# Patient Record
Sex: FEMALE | Race: WHITE | NOT HISPANIC OR LATINO | Employment: FULL TIME | URBAN - NONMETROPOLITAN AREA
[De-identification: names, ages, dates, MRNs, and addresses within clinical notes are randomized per-mention and may not be internally consistent; named-entity substitution may affect disease eponyms.]

---

## 2017-08-15 ENCOUNTER — OFFICE VISIT - GICH (OUTPATIENT)
Dept: FAMILY MEDICINE | Facility: OTHER | Age: 32
End: 2017-08-15

## 2017-08-15 ENCOUNTER — HISTORY (OUTPATIENT)
Dept: FAMILY MEDICINE | Facility: OTHER | Age: 32
End: 2017-08-15

## 2017-08-15 DIAGNOSIS — R39.89 OTHER SYMPTOMS AND SIGNS INVOLVING THE GENITOURINARY SYSTEM: ICD-10-CM

## 2017-08-15 DIAGNOSIS — N89.8 OTHER SPECIFIED NONINFLAMMATORY DISORDERS OF VAGINA (CODE): ICD-10-CM

## 2017-08-15 LAB
BILIRUB UR QL: NEGATIVE
CLARITY, URINE: CLEAR CLARITY
COLOR UR: YELLOW COLOR
GLUCOSE URINE: NEGATIVE MG/DL
KETONES UR QL: NEGATIVE MG/DL
LEUKOCYTE ESTERASE URINE: NEGATIVE
NITRITE UR QL STRIP: NEGATIVE
OCCULT BLOOD,URINE - HISTORICAL: NEGATIVE
PH UR: 5.5 [PH]
PROTEIN QUALITATIVE,URINE - HISTORICAL: NEGATIVE MG/DL
SP GR UR STRIP: <=1.005
UROBILINOGEN,QUALITATIVE - HISTORICAL: NORMAL EU/DL

## 2017-12-19 ENCOUNTER — HISTORY (OUTPATIENT)
Dept: FAMILY MEDICINE | Facility: OTHER | Age: 32
End: 2017-12-19

## 2017-12-19 ENCOUNTER — OFFICE VISIT - GICH (OUTPATIENT)
Dept: FAMILY MEDICINE | Facility: OTHER | Age: 32
End: 2017-12-19

## 2017-12-19 DIAGNOSIS — Z30.432 ENCOUNTER FOR REMOVAL OF INTRAUTERINE CONTRACEPTIVE DEVICE: ICD-10-CM

## 2017-12-28 NOTE — PROGRESS NOTES
"Patient Information     Patient Name MRN Chasidy Cherry [id number] Female 1985      Progress Notes by Aye Cortez NP at 8/15/2017  5:30 PM     Author:  Aye Cortez NP Service:  (none) Author Type:  PHYS- Nurse Practitioner     Filed:  8/15/2017  8:26 PM Encounter Date:  8/15/2017 Status:  Signed     :  Aye Cortez NP (PHYS- Nurse Practitioner)            SUBJECTIVE:    Chasidy Hayes is a 32 y.o. female who presents for dysuria     Dysuria    This is a new problem. Episode onset: for the past month. The problem has been unchanged. The quality of the pain is described as aching and burning. The pain is mild. There has been no fever. She is sexually active. There is no history of pyelonephritis. Associated symptoms include a discharge. Pertinent negatives include no chills, flank pain, frequency, hematuria, hesitancy, nausea, possible pregnancy, sweats, urgency or vomiting. She has tried increased fluids for the symptoms. The treatment provided mild relief. There is no history of catheterization, kidney stones, recurrent UTIs, a single kidney, urinary stasis or a urological procedure. HX of BV       No current outpatient prescriptions on file prior to visit.     No current facility-administered medications on file prior to visit.        REVIEW OF SYSTEMS:  Review of Systems   Constitutional: Negative for chills.   Gastrointestinal: Negative for nausea and vomiting.   Genitourinary: Positive for dysuria. Negative for flank pain, frequency, hematuria, hesitancy and urgency.       OBJECTIVE:  /64  Pulse 72  Temp 98.4  F (36.9  C) (Tympanic)  Ht 1.6 m (5' 3\")  Wt 67.3 kg (148 lb 6.4 oz)  BMI 26.29 kg/m2    EXAM:   Physical Exam   Constitutional: She is well-developed, well-nourished, and in no distress.   HENT:   Head: Normocephalic and atraumatic.   Eyes: Conjunctivae are normal.   Cardiovascular: Normal rate.    Pulmonary/Chest: Effort normal. No respiratory " distress.   Abdominal: Soft. Bowel sounds are normal. She exhibits no distension. There is no tenderness. There is no rebound and no guarding.   Genitourinary: Cervix normal. Thick  creamy  white and vaginal discharge found.   Neurological: She is alert.   Skin: Skin is warm and dry.   Psychiatric: Mood and affect normal.   Nursing note and vitals reviewed.    Results for orders placed or performed in visit on 08/15/17      URINALYSIS W REFLEX MICROSCOPIC IF POSITIVE      Result  Value Ref Range    COLOR                     Yellow Yellow Color    CLARITY                   Clear Clear Clarity    SPECIFIC GRAVITY,URINE    <=1.005 (A) 1.010, 1.015, 1.020, 1.025                    PH,URINE                  5.5 6.0, 7.0, 8.0, 5.5, 6.5, 7.5, 8.5                    UROBILINOGEN,QUALITATIVE  Normal Normal EU/dl    PROTEIN, URINE Negative Negative mg/dL    GLUCOSE, URINE Negative Negative mg/dL    KETONES,URINE             Negative Negative mg/dL    BILIRUBIN,URINE           Negative Negative                    OCCULT BLOOD,URINE        Negative Negative                    NITRITE                   Negative Negative                    LEUKOCYTE ESTERASE        Negative Negative                   WET PREP GENITAL      Result  Value Ref Range    TRICHOMONAS               None Seen None Seen    YEAST                     None Seen None Seen    CLUE CELLS                None Seen None Seen       ASSESSMENT/PLAN:    ICD-10-CM    1. Urinary problem R39.89 URINALYSIS W REFLEX MICROSCOPIC IF POSITIVE      URINALYSIS W REFLEX MICROSCOPIC IF POSITIVE   2. Vaginal irritation N89.8 WET PREP GENITAL      WET PREP GENITAL        Plan:  Completed labs at today's visit Wet prep, U/A.  I personally reviewed the labs with the patient/parent at the visit. Abnormalities include None. Discussed lab results. Recommend she f/u with pcp if needed or if not getting better. I explained my diagnostic considerations and recommendations to the patient,  who voiced understanding and agreement with the treatment plan. All questions were answered. We discussed potential side effects of any prescribed or recommended therapies, as well as expectations for response to treatments. She was advised to contact our office if there is no improvement or worsening of conditions or symptoms.  If s/s worsen or persist, patient will either come back or follow up with PCP.       RODO RODRIGUEZ NP ....................  8/15/2017   8:26 PM

## 2017-12-28 NOTE — PATIENT INSTRUCTIONS
Patient Information     Patient Name MRN Tj Hayes April L [id number] Female 1985      Patient Instructions by Aye Cortez NP at 8/15/2017  5:30 PM     Author:  Aye Cortez NP Service:  (none) Author Type:  PHYS- Nurse Practitioner     Filed:  8/15/2017  6:18 PM Encounter Date:  8/15/2017 Status:  Signed     :  Aye Cortez NP (PHYS- Nurse Practitioner)            Thank you for choosing [facility] for your care.     You are advised to contact our office if there is no improvement or if there is worsening of conditions or symptoms, either come back or follow up with your primary care provider.     You were seen in the [facility]. This is for urgent care needs. If you have other questions or concerns please see your primary care provider.           Aye Cortez RN, MSN, FNP  [facility]

## 2018-01-26 VITALS
HEART RATE: 72 BPM | TEMPERATURE: 98.4 F | HEIGHT: 63 IN | WEIGHT: 148.4 LBS | SYSTOLIC BLOOD PRESSURE: 110 MMHG | DIASTOLIC BLOOD PRESSURE: 64 MMHG | BODY MASS INDEX: 26.29 KG/M2

## 2018-02-05 ENCOUNTER — DOCUMENTATION ONLY (OUTPATIENT)
Dept: FAMILY MEDICINE | Facility: OTHER | Age: 33
End: 2018-02-05

## 2018-02-05 PROBLEM — Z86.69 PERSONAL HISTORY OF OTHER DISORDERS OF NERVOUS SYSTEM AND SENSE ORGANS: Status: ACTIVE | Noted: 2018-02-05

## 2018-02-05 RX ORDER — DOXYCYCLINE 100 MG/1
1 CAPSULE ORAL DAILY
COMMUNITY
Start: 2017-06-26 | End: 2018-10-18

## 2018-02-05 RX ORDER — ERYTHROMYCIN 20 MG/G
GEL TOPICAL
COMMUNITY
Start: 2017-11-22

## 2018-02-05 RX ORDER — BENZOYL PEROXIDE 10 G/100G
SUSPENSION TOPICAL
COMMUNITY
Start: 2017-11-24

## 2018-02-09 VITALS
WEIGHT: 147 LBS | HEIGHT: 63 IN | SYSTOLIC BLOOD PRESSURE: 94 MMHG | HEART RATE: 62 BPM | BODY MASS INDEX: 26.05 KG/M2 | DIASTOLIC BLOOD PRESSURE: 70 MMHG

## 2018-02-12 NOTE — PROGRESS NOTES
"Patient Information     Patient Name MRN Chasidy Cherry [id number] Female 1985      Progress Notes by Elsy Ogden MD at 2017  7:45 AM     Author:  Elsy Ogden MD Service:  (none) Author Type:  Physician     Filed:  2017  8:12 AM Encounter Date:  2017 Status:  Signed     :  Elsy Ogden MD (Physician)            Chasidy Hayes is a 32 y.o.  patient with Mirena IUD is for removal. She has recently had increased vaginal discharge and pelvic pain thought related to the Mirena and is here for removal. She had STD testing done at the [facility] because of the pain and all was negative. Her spouse is scheduled for vasectomy tomorrow.  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    2 sons from previous relationship and 2 stepsons.     10/2013 to Shakir                           O:  BP 94/70 (Cuff Site: Right Arm, Position: Sitting, Cuff Size: Adult Regular)  Pulse 62  Ht 1.6 m (5' 2.99\")  Wt 66.7 kg (147 lb)  BMI 26.05 kg/m2  A pelvic exam was performed which revealed the string to be present  and this was grasped and with an easy tug the intrauterine device was  removed.     A:  1. Encounter for IUD removal      Plan:  Patient and spouse plan abstinence or condoms until cleared from vasectomy which he has planned for tomorrow.    Elsy Ogden MD  8:06 AM 2017          "

## 2018-02-12 NOTE — NURSING NOTE
Patient Information     Patient Name MRN Chasidy Cherry [id number] Female 1985      Nursing Note by Letty García at 2017  7:45 AM     Author:  Letty García Service:  (none) Author Type:  (none)     Filed:  2017  8:05 AM Encounter Date:  2017 Status:  Signed     :  Letty García            Chasidy Hayes is a 32 y.o. Female here to have mirena removed.  Danni García LPN ...... 2017 7:50 AM

## 2018-02-12 NOTE — PATIENT INSTRUCTIONS
Patient Information     Patient Name MRN Tj Hayes April L [id number] Female 1985      Patient Instructions by Elsy Ogden MD at 2017  7:45 AM     Author:  Elsy Ogden MD Service:  (none) Author Type:  Physician     Filed:  2017  8:10 AM Encounter Date:  2017 Status:  Signed     :  Elsy Ogden MD (Physician)            Follow up for routine GYN care.  Last pap 2017, next in 3 years

## 2018-03-25 ENCOUNTER — HEALTH MAINTENANCE LETTER (OUTPATIENT)
Age: 33
End: 2018-03-25

## 2018-06-26 ENCOUNTER — OFFICE VISIT (OUTPATIENT)
Dept: FAMILY MEDICINE | Facility: OTHER | Age: 33
End: 2018-06-26
Attending: PHYSICIAN ASSISTANT
Payer: COMMERCIAL

## 2018-06-26 VITALS
WEIGHT: 148 LBS | BODY MASS INDEX: 26.22 KG/M2 | HEART RATE: 81 BPM | SYSTOLIC BLOOD PRESSURE: 122 MMHG | DIASTOLIC BLOOD PRESSURE: 78 MMHG | TEMPERATURE: 97.2 F

## 2018-06-26 DIAGNOSIS — R21 RASH: Primary | ICD-10-CM

## 2018-06-26 PROCEDURE — 99213 OFFICE O/P EST LOW 20 MIN: CPT | Performed by: PHYSICIAN ASSISTANT

## 2018-06-26 RX ORDER — CLOTRIMAZOLE AND BETAMETHASONE DIPROPIONATE 10; .64 MG/G; MG/G
CREAM TOPICAL 2 TIMES DAILY
Qty: 15 G | Refills: 1 | Status: SHIPPED | OUTPATIENT
Start: 2018-06-26 | End: 2018-07-10

## 2018-06-26 RX ORDER — TRIAMCINOLONE ACETONIDE 5 MG/G
CREAM TOPICAL
Refills: 1 | COMMUNITY
Start: 2018-01-15

## 2018-06-26 ASSESSMENT — PAIN SCALES - GENERAL: PAINLEVEL: MILD PAIN (2)

## 2018-06-26 NOTE — NURSING NOTE
RASH  Onset: since December, worsened this month  Location: neck  Red: yes  Swollen: yes  Itching: yes  Fever: no  Sting/Bite: no  New Rx recently: no  New soap, fabric softener or detergent: no  Recentvaccine:  No  Patient has tried triamcilone with no relief.  Veronica Ortiz LPN .............6/26/2018  11:22 AM

## 2018-06-26 NOTE — PROGRESS NOTES
SUBJECTIVE  HPI:  Chasidy Hayes is a 33 year old female who presents to the clinic today for a rash.  Onset of rash was 7 months ago - December. 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 A week after seeing this patient she broke out with the rash on her anterior neck.   Associated symptoms include: itches and burns, red, swelling.  Symptoms appear to be worsening past 4 days after a recent trip to [place].   Therapies tried to improve the rash: triamcinolone twice daily for past 4 days.   Previous history of a similar rash? Yes:   Recent exposure history: no new medications, foods, cosmetics, clothes    Patient Active Problem List   Diagnosis     Personal history of other disorders of nervous system and sense organs       Patient Active Problem List 06/26/2018  (No Known Allergies)   - Alexis as Reviewed 06/26/2018      Current Outpatient Prescriptions   Medication Sig Dispense Refill     benzoyl peroxide (BP WASH) 10 % LIQD ABEL 3 ML EXT AA QD       doxycycline monohydrate 100 MG capsule Take 1 capsule by mouth daily       erythromycin with ethanol (EMGEL) 2 % gel APPLY SPARINGLY TOPICALLY TO WASHED AND DRY SKIN BID       triamcinolone (KENALOG) 0.5 % cream ABEL EXT AA BID FOR 7 DAYS  1       ROS  General: feels well, no fever  Skin: Rash on neck    EXAM:   VITALS: /78 (BP Location: Right arm, Patient Position: Sitting, Cuff Size: Adult Regular)  Pulse 81  Temp 97.2  F (36.2  C) (Tympanic)  Wt 148 lb (67.1 kg)  Breastfeeding? No  BMI 26.22 kg/m2     General:healthy, alert and no distress  Rash description:     Location: neck     Distribution: localized     Rash is erythematous patches, mildly raised and dry. No warmth.         ASSESSMENT / IMPRESSION:  (R21) Rash  (primary encounter diagnosis)    Plan: clotrimazole-betamethasone (LOTRISONE) cream,         DERMATOLOGY REFERRAL    Rash, appears to be a atopic eczema or possibly  fungal  Moisturize daily with Vanicream or vaseline  Lotrisone topical cream to affected area, smallest effective dose to affected area only twice daily for 14 days.   For symptomatic treatment for itching - apply cool compress  Referral to dermatology, they will call you to make an appointment  Follow up with PCP as needed  Patient received verbal and written instruction including review of warning signs    Jerri Syed PA-C on 6/26/2018 at 6:33 PM

## 2018-06-26 NOTE — MR AVS SNAPSHOT
After Visit Summary   6/26/2018    Chasidy Hayes    MRN: [id number]           Patient Information     Date Of Birth          1985        Visit Information        Provider Department      6/26/2018 10:45 AM Jerri Syed PA-C [facility]        Today's Diagnoses     Rash    -  1      Care Instructions    Rash, appears to be a atopic eczema or possibly fungal  Moisturize daily with Vanicream or vaseline  Lotrisone topical cream to affected area, smallest effective dose to affected area only twice daily for 14 days.   For symptomatic treatment for itching - apply cool compress  Referral to dermatology, they will call you to make an appointment  Follow up with PCP as needed  Seek immediate care for    Increasing area of redness or pain in the skin    Yellow crusts or wet drainage from the rash    Fever of 100.4 F (38 C) or higher, or as directed by your healthcare provider            Fungal Skin Infection (Tinea)  A fungal infection occurs when too much fungus grows on or in the body. Fungus normally lives on the skin in small amounts and does not cause harm. But when too much grows on the skin, it causes an infection. This is also known as tinea. Fungal skin infections are common and not usually serious.  The infection often starts as a small red area the size of a pea. The skin may turn dry and flaky. The area may itch. As the fungus grows, it spreads out in a red [place]. Because of how it looks, fungal skin infection is often called ringworm, but it is not caused by a worm. Fungal skin infections can occur on many parts of the body. They can grow on the head, chest, arms, or legs. They can occur on the buttocks. On the feet, fungal infection is known as  athlete s foot.  It causes itchy, sometimes painful sores between the toes and the bottom or sides of the feet. In the groin, the rash is called  jock itch.   People with weak immune systems can get a fungal infection more  easily. This includes people with diabetes or HIV, or who are being treated for cancer. In these cases, the fungal infection can spread and cause severe illness. Fungal infections are also more common in people who are overweight.  In most cases, treatment is done with antifungal cream or ointment. If the infection is on your scalp, you may take oral medicine. In some cases, a tiny piece of the skin (biopsy) may be taken. This is so it can be tested in a lab.  Common fungal infections are treated with creams on the skin or oral medicine.  Home care  Follow all instructions when using antifungal cream or ointment on your skin. Your healthcare provider may advise using cornstarch powder to keep your skin dry or petroleum jelly to provide a barrier.  General care:    If you were prescribed an oral medicine, read the patient information. Talk with your healthcare provider about the risks and side effects.    Let your skin dry completely after bathing. Carefully dry your feet and between your toes.    Dress in loose cotton clothing.    Don t scratch the affected area. This can delay healing and may spread the infection. It can also cause a bacterial infection.    Keep your skin clean, but don t wash the skin too much. This can irritate your skin.    Keep in mind that it may take a week before the fungus starts to go away. It can take 2 to 4 weeks to fully clear. To prevent it from coming back, use the medicine until the rash is all gone.  Follow-up care  Follow up with your healthcare provider if the rash does not get better after 10 days of treatment. Also follow up if the rash spreads to other parts of your body.  When to seek medical advice  Call your healthcare provider right away if any of these occur:    Fever of 100.4 F (38 C) or higher    Redness or swelling that gets worse    Pain that gets worse    Foul-smelling fluid leaking from the skin  Date Last Reviewed: 11/1/2016 2000-2017 [employer]  [address] [place] [address]. All rights reserved. This information is not intended as a substitute for professional medical care. Always follow your healthcare professional's instructions.        Atopic Dermatitis (Adult)  Atopic dermatitis is a dry, itchy, red rash. It s also called eczema. The rash is chronic, or ongoing. It can come and go over time. The disease is often passed down in families. It causes a problem with the skin barrier that makes the skin more sensitive to the environment and other factors. The increased skin sensitivity causes an itch, which causes scratching. Scratching can worsen the itching or also break the skin. This can put the skin at risk of infection.  The condition is most common in people with asthma, hay fever, hives, or dry or sensitive skin. The rash may be caused by extreme heat or heavy sweating. Skin irritants can cause the rash to flare up. These can include wool or silk clothing, grease, oils, some medicines, and harsh soaps and detergents. Emotional stress can also be a trigger.  Treatment is done to relieve the itching and inflammation of the skin.  Home care  Follow these tips to care for your condition:    Keep the areas of rash clean by bathing at least every other day. Use lukewarm water to bathe. Don t use hot water, which can dry out the skin.    Don t use soaps with strong detergents. Use mild soaps made for sensitive skin.    Apply a cream or ointment to damp skin right after bathing.    Avoid things that irritate your skin. Wear absorbent, soft fabrics next to the skin rather than rough or scratchy materials.    Use mild laundry soap free of scents and perfumes. Make sure to rinse all the soap out of your clothes.    Treat any skin infection as directed.    Use oral diphenhydramine to help reduce itching. This is an antihistamine you can buy at drug and grocery stores. It can make you sleepy, so use lower doses during the daytime. Or you can use  loratadine. This is an antihistamine that will not make you sleepy. Do not use diphenhydramine if you have glaucoma or have trouble urinating due to an enlarged prostate.  Follow-up care  See your healthcare provider, or as advised. If your symptoms don t get better or if they get worse in the next 7 days, make an appointment with your healthcare provider.  When to seek medical advice  Call your healthcare provider right away  if any of these occur:    Increasing area of redness or pain in the skin    Yellow crusts or wet drainage from the rash    Fever of 100.4 F (38 C) or higher, or as directed by your healthcare provider  Date Last Reviewed: 9/1/2016 2000-2017 [employer]. [address] [place] [address], [place] [address]. All rights reserved. This information is not intended as a substitute for professional medical care. Always follow your healthcare professional's instructions.                Follow-ups after your visit        Additional Services     DERMATOLOGY REFERRAL       Your provider has referred you to: [facility]: [facility] [place] [phone]  [facility] [place] [phone]   [email or web address]    Please be aware that coverage of these services is subject to the terms and limitations of your health insurance plan.  Call member services at your health plan with any benefit or coverage questions.      Please bring the following with you to your appointment:    (1) Any X-Rays, CTs or MRIs which have been performed.  Contact the facility where they were done to arrange for  prior to your scheduled appointment.    (2) List of current medications  (3) This referral request   (4) Any documents/labs given to you for this referral                  Follow-up notes from your care team     Return if symptoms worsen or fail to improve.      Who to contact     If you have questions or need follow up information about today's clinic visit or your schedule please  "contact [facility] AND [facility] directly at [phone].  Normal or non-critical lab and imaging results will be communicated to you by MyChart, letter or phone within 4 business days after the clinic has received the results. If you do not hear from us within 7 days, please contact the clinic through MyChart or phone. If you have a critical or abnormal lab result, we will notify you by phone as soon as possible.  Submit refill requests through [employer] or call your pharmacy and they will forward the refill request to us. Please allow 3 business days for your refill to be completed.          Additional Information About Your Visit        [employer] Information     [employer] lets you send messages to your doctor, view your test results, renew your prescriptions, schedule appointments and more. To sign up, go to [email or web address] . Click on \"Log in\" on the left side of the screen, which will take you to the Welcome page. Then click on \"Sign up Now\" on the right side of the page.     You will be asked to enter the access code listed below, as well as some personal information. Please follow the directions to create your username and password.     Your access code is: [email or web address]  Expires: 2018 11:26 AM     Your access code will  in 90 days. If you need help or a new code, please call your [facility] clinic or [phone].        Care EveryWhere ID     This is your Care EveryWhere ID. This could be used by other organizations to access your [facility] medical records  [id number]        Your Vitals Were     Pulse Temperature Breastfeeding? BMI (Body Mass Index)          81 97.2  F (36.2  C) (Tympanic) No 26.22 kg/m2         Blood Pressure from Last 3 Encounters:   18 122/78   17 94/70   08/15/17 110/64    Weight from Last 3 Encounters:   18 148 lb (67.1 kg)   17 147 lb (66.7 kg)   08/15/17 148 lb 6.4 oz (67.3 kg)              We Performed the Following     DERMATOLOGY REFERRAL "          Today's Medication Changes          These changes are accurate as of 6/26/18 12:01 PM.  If you have any questions, ask your nurse or doctor.               Start taking these medicines.        Dose/Directions    clotrimazole-betamethasone cream   Commonly known as:  LOTRISONE   Used for:  Rash   Started by:  Jerri Syed PA-C        Apply topically 2 times daily for 14 days   Quantity:  15 g   Refills:  1            Where to get your medicines      These medications were sent to [facility] - [place], [place] - [address]  [address], [facility] [address]     Phone:  [phone]     clotrimazole-betamethasone cream                Primary Care Provider Office Phone # Fax #    Elsyjona Ogden [phone]       [address]  [facility] [address]        Equal Access to Services     [facility]: Hadii aad ku hadasho Soomaali, waaxda luqadaha, qaybta kaalmada adeegyada, ricki lombardo haygeetan adina oreilly . So [facility] [phone].    ATENCIÓN: Si habla español, tiene a miller disposición servicios gratuitos de asistencia lingüística. Llame al [phone].    We comply with applicable federal civil rights laws and [place] laws. We do not discriminate on the basis of race, color, national origin, age, disability, sex, sexual orientation, or gender identity.            Thank you!     Thank you for choosing [facility] AND [facility]  for your care. Our goal is always to provide you with excellent care. Hearing back from our patients is one way we can continue to improve our services. Please take a few minutes to complete the written survey that you may receive in the mail after your visit with us. Thank you!             Your Updated Medication List - Protect others around you: Learn how to safely use, store and throw away your medicines at www.disposemymeds.org.          This list is accurate as of 6/26/18 12:01 PM.  Always  use your most recent med list.                   Brand Name Dispense Instructions for use Diagnosis    BP WASH 10 % Liqd   Generic drug:  benzoyl peroxide      ABEL 3 ML EXT AA QD        clotrimazole-betamethasone cream    LOTRISONE    15 g    Apply topically 2 times daily for 14 days    Rash       doxycycline monohydrate 100 MG capsule      Take 1 capsule by mouth daily        erythromycin with ethanol 2 % gel    EMGEL     APPLY SPARINGLY TOPICALLY TO WASHED AND DRY SKIN BID        triamcinolone 0.5 % cream    KENALOG     ABEL EXT AA BID FOR 7 DAYS

## 2018-06-26 NOTE — PATIENT INSTRUCTIONS
Rash, appears to be a atopic eczema or possibly fungal  Moisturize daily with Vanicream or vaseline  Lotrisone topical cream to affected area, smallest effective dose to affected area only twice daily for 14 days.   For symptomatic treatment for itching - apply cool compress  Referral to dermatology, they will call you to make an appointment  Follow up with PCP as needed  Seek immediate care for    Increasing area of redness or pain in the skin    Yellow crusts or wet drainage from the rash    Fever of 100.4 F (38 C) or higher, or as directed by your healthcare provider            Fungal Skin Infection (Tinea)  A fungal infection occurs when too much fungus grows on or in the body. Fungus normally lives on the skin in small amounts and does not cause harm. But when too much grows on the skin, it causes an infection. This is also known as tinea. Fungal skin infections are common and not usually serious.  The infection often starts as a small red area the size of a pea. The skin may turn dry and flaky. The area may itch. As the fungus grows, it spreads out in a red [place]. Because of how it looks, fungal skin infection is often called ringworm, but it is not caused by a worm. Fungal skin infections can occur on many parts of the body. They can grow on the head, chest, arms, or legs. They can occur on the buttocks. On the feet, fungal infection is known as  athlete s foot.  It causes itchy, sometimes painful sores between the toes and the bottom or sides of the feet. In the groin, the rash is called  jock itch.   People with weak immune systems can get a fungal infection more easily. This includes people with diabetes or HIV, or who are being treated for cancer. In these cases, the fungal infection can spread and cause severe illness. Fungal infections are also more common in people who are overweight.  In most cases, treatment is done with antifungal cream or ointment. If the infection is on your scalp, you may take  oral medicine. In some cases, a tiny piece of the skin (biopsy) may be taken. This is so it can be tested in a lab.  Common fungal infections are treated with creams on the skin or oral medicine.  Home care  Follow all instructions when using antifungal cream or ointment on your skin. Your healthcare provider may advise using cornstarch powder to keep your skin dry or petroleum jelly to provide a barrier.  General care:    If you were prescribed an oral medicine, read the patient information. Talk with your healthcare provider about the risks and side effects.    Let your skin dry completely after bathing. Carefully dry your feet and between your toes.    Dress in loose cotton clothing.    Don t scratch the affected area. This can delay healing and may spread the infection. It can also cause a bacterial infection.    Keep your skin clean, but don t wash the skin too much. This can irritate your skin.    Keep in mind that it may take a week before the fungus starts to go away. It can take 2 to 4 weeks to fully clear. To prevent it from coming back, use the medicine until the rash is all gone.  Follow-up care  Follow up with your healthcare provider if the rash does not get better after 10 days of treatment. Also follow up if the rash spreads to other parts of your body.  When to seek medical advice  Call your healthcare provider right away if any of these occur:    Fever of 100.4 F (38 C) or higher    Redness or swelling that gets worse    Pain that gets worse    Foul-smelling fluid leaking from the skin  Date Last Reviewed: 11/1/2016 2000-2017 [employer]. [address] [place] [address], [place] [id number]. All rights reserved. This information is not intended as a substitute for professional medical care. Always follow your healthcare professional's instructions.        Atopic Dermatitis (Adult)  Atopic dermatitis is a dry, itchy, red rash. It s also called eczema. The rash is chronic, or ongoing. It can  come and go over time. The disease is often passed down in families. It causes a problem with the skin barrier that makes the skin more sensitive to the environment and other factors. The increased skin sensitivity causes an itch, which causes scratching. Scratching can worsen the itching or also break the skin. This can put the skin at risk of infection.  The condition is most common in people with asthma, hay fever, hives, or dry or sensitive skin. The rash may be caused by extreme heat or heavy sweating. Skin irritants can cause the rash to flare up. These can include wool or silk clothing, grease, oils, some medicines, and harsh soaps and detergents. Emotional stress can also be a trigger.  Treatment is done to relieve the itching and inflammation of the skin.  Home care  Follow these tips to care for your condition:    Keep the areas of rash clean by bathing at least every other day. Use lukewarm water to bathe. Don t use hot water, which can dry out the skin.    Don t use soaps with strong detergents. Use mild soaps made for sensitive skin.    Apply a cream or ointment to damp skin right after bathing.    Avoid things that irritate your skin. Wear absorbent, soft fabrics next to the skin rather than rough or scratchy materials.    Use mild laundry soap free of scents and perfumes. Make sure to rinse all the soap out of your clothes.    Treat any skin infection as directed.    Use oral diphenhydramine to help reduce itching. This is an antihistamine you can buy at drug and grocery stores. It can make you sleepy, so use lower doses during the daytime. Or you can use loratadine. This is an antihistamine that will not make you sleepy. Do not use diphenhydramine if you have glaucoma or have trouble urinating due to an enlarged prostate.  Follow-up care  See your healthcare provider, or as advised. If your symptoms don t get better or if they get worse in the next 7 days, make an appointment with your healthcare  provider.  When to seek medical advice  Call your healthcare provider right away  if any of these occur:    Increasing area of redness or pain in the skin    Yellow crusts or wet drainage from the rash    Fever of 100.4 F (38 C) or higher, or as directed by your healthcare provider  Date Last Reviewed: 9/1/2016 2000-2017 [employer]. [address] [place], [place] [id number]. All rights reserved. This information is not intended as a substitute for professional medical care. Always follow your healthcare professional's instructions.

## 2018-10-18 ENCOUNTER — OFFICE VISIT (OUTPATIENT)
Dept: PEDIATRICS | Facility: OTHER | Age: 33
End: 2018-10-18
Attending: INTERNAL MEDICINE
Payer: COMMERCIAL

## 2018-10-18 VITALS
BODY MASS INDEX: 26.91 KG/M2 | SYSTOLIC BLOOD PRESSURE: 116 MMHG | DIASTOLIC BLOOD PRESSURE: 64 MMHG | WEIGHT: 151.9 LBS | HEART RATE: 88 BPM

## 2018-10-18 DIAGNOSIS — M77.8 TENDONITIS OF WRIST, RIGHT: Primary | ICD-10-CM

## 2018-10-18 DIAGNOSIS — Z86.69 H/O GUILLAIN-BARRE SYNDROME: ICD-10-CM

## 2018-10-18 PROCEDURE — 99213 OFFICE O/P EST LOW 20 MIN: CPT | Performed by: INTERNAL MEDICINE

## 2018-10-18 RX ORDER — VALACYCLOVIR HYDROCHLORIDE 1 G/1
TABLET, FILM COATED ORAL
Refills: 2 | COMMUNITY
Start: 2018-06-26

## 2018-10-18 ASSESSMENT — PAIN SCALES - GENERAL: PAINLEVEL: MODERATE PAIN (4)

## 2018-10-18 NOTE — NURSING NOTE
"Chief Complaint   Patient presents with     Musculoskeletal Problem   Pt present to clinic today for right arm and wrist pain that she has had since last night. She states it is painful and tingles and burns. She has a history of Gullian Left Hand Syndrome.    Initial /64 (BP Location: Right arm, Patient Position: Sitting, Cuff Size: Adult Regular)  Pulse 88  Wt 151 lb 14.4 oz (68.9 kg)  Breastfeeding? No  BMI 26.91 kg/m2 Estimated body mass index is 26.91 kg/(m^2) as calculated from the following:    Height as of 12/19/17: 5' 2.99\" (1.6 m).    Weight as of this encounter: 151 lb 14.4 oz (68.9 kg).  Medication Reconciliation: complete    Tina Brizuela LPN  "

## 2018-10-18 NOTE — PROGRESS NOTES
Subjective  Chasidy Hayes is a 33 year old female who presents for right wrist hurts.  7 days ago she took her influenza vaccine in the right arm.  Yesterday she started developing a tingling pain throughout the entire right hand.  It did radiate up as an aching in the right arm a little bit.  No symptoms in the left arm or foot.  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  This required admission to [facility] in [place], IVIG treatment and prolonged hospital stay requiring her to learn how to walk again.  Was never intubated or tracheostomy.  Last year she got the influenza vaccine and had no complication.  She has not noticed any weakness this time.    Problem List/PMH: reviewed in EMR, and made relevant updates today.  Medications: reviewed in EMR, and made relevant updates today.  Allergies: reviewed in EMR, and made relevant updates today.    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    2 sons from previous relationship and 2 stepsons.     10/2013 to Shakir     I reviewed social history and made relevant updates today.    Family Hx:   Family History   Problem Relation Age of Onset     Family History Negative Mother      Good Health     Family History Negative Father      Good Health     Diabetes Maternal Grandmother      Diabetes,Type 2 diabetes       Objective  Vitals: reviewed in EMR.  /64 (BP Location: Right arm, Patient Position: Sitting, Cuff Size: Adult Regular)  Pulse 88  Wt 151 lb 14.4 oz (68.9 kg)  Breastfeeding? No  BMI 26.91 kg/m2    Gen: Pleasant female, NAD.  HEENT: MMM  Neck: Supple  Pulm: Breathing easily  Neuro: Grossly intact.  Sensation intact to light touch in  hands bilaterally.  Strength in hands 5/5.  Reflexes at the forearm is symmetric.  Negative Phalen and Tinel signs.  Skin: No concerning lesions.  No erythema the right shoulder where influenza vaccine was administered.  Psychiatric: Normal affect and insight. Does not appear anxious or depressed.  Musculoskeletal: Tenderness to palpation along the extensor tendon of the right thumb and first MCP joint.      Assessment    ICD-10-CM    1. Tendonitis of wrist, right M77.8 PHYSICAL THERAPY REFERRAL   2. 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  No specific triggering event occurred.  Could certainly be symptoms with an early Guillain-Barré syndrome and warning signs were discussed.  If symptoms persist recommend physical therapy.  If symptoms worsen recommend repeat evaluation as additional testing may be required including but not limited to MRI, EMG, LP, etc.  While it is not a contraindication I would recommend against influenza vaccination next year.    Plan   -- Expected clinical course discussed   -- Monitor   -- If symptoms persist, PT   -- If symptoms worsen, return for recheck    Signed, Sean Carrasquillo MD  Internal Medicine & Pediatrics

## 2018-10-18 NOTE — MR AVS SNAPSHOT
After Visit Summary   10/18/2018    April SYLWIA Hayes    MRN: [id number]           Patient Information     Date Of Birth          1985        Visit Information        Provider Department      10/18/2018 1:45 PM Sean Carrasquillo MD [facility]        Today's Diagnoses     Tendonitis of wrist, right    -  1    H/O Guillain-[place] syndrome           Follow-ups after your visit        Additional Services     PHYSICAL THERAPY REFERRAL       [facility]                  Future tests that were ordered for you today     Open Future Orders        Priority Expected Expires Ordered    PHYSICAL THERAPY REFERRAL Routine  10/18/2019 10/18/2018            Who to contact     If you have questions or need follow up information about today's clinic visit or your schedule please contact [facility] directly at [phone].  Normal or non-critical lab and imaging results will be communicated to you by MyChart, letter or phone within 4 business days after the clinic has received the results. If you do not hear from us within 7 days, please contact the clinic through MyChart or phone. If you have a critical or abnormal lab result, we will notify you by phone as soon as possible.  Submit refill requests through [employer] or call your pharmacy and they will forward the refill request to us. Please allow 3 business days for your refill to be completed.          Additional Information About Your Visit        Care EveryWhere ID     This is your Care EveryWhere ID. This could be used by other organizations to access your [facility] medical records  [id number]        Your Vitals Were     Pulse Breastfeeding? BMI (Body Mass Index)             88 No 26.91 kg/m2          Blood Pressure from Last 3 Encounters:   10/18/18 116/64   06/26/18 122/78   12/19/17 94/70    Weight from Last 3 Encounters:   10/18/18 151 lb 14.4 oz (68.9 kg)   06/26/18 148 lb (67.1 kg)   12/19/17 147 lb (66.7 kg)                Primary Care Provider Office Phone # Fax #    Elsy Ninoska Ogden [phone] [address]  [place] [address]        Equal Access to Services     TIMIJONH SHIRLEY : Hadii natan justin lito Herbert, wadeniada luqfred, qaybta kanahidda laura, ricki mcdonald laJulianawilbur bravo. So [facility] [phone].    ATENCIÓN: Si habla español, tiene a miller disposición servicios gratuitos de asistencia lingüística. Llame al [phone].    We comply with applicable federal civil rights laws and [place] laws. We do not discriminate on the basis of race, color, national origin, age, disability, sex, sexual orientation, or gender identity.            Thank you!     Thank you for choosing [facility] AND [facility] [place]  for your care. Our goal is always to provide you with excellent care. Hearing back from our patients is one way we can continue to improve our services. Please take a few minutes to complete the written survey that you may receive in the mail after your visit with us. Thank you!             Your Updated Medication List - Protect others around you: Learn how to safely use, store and throw away your medicines at www.disposemymeds.org.          This list is accurate as of 10/18/18  2:13 PM.  Always use your most recent med list.                   Brand Name Dispense Instructions for use Diagnosis    BP WASH 10 % topical liquid   Generic drug:  benzoyl peroxide      ABEL 3 ML EXT AA QD        erythromycin with ethanol 2 % gel    EMGEL     APPLY SPARINGLY TOPICALLY TO WASHED AND DRY SKIN BID        triamcinolone 0.5 % cream    KENALOG     ABEL EXT AA BID FOR 7 DAYS        valACYclovir 1000 mg tablet    VALTREX     TK 2 TS PO BID FOR 1 DAY PRN AT FIRST SIGN OF OUTBREAK

## 2018-11-01 ENCOUNTER — TRANSFERRED RECORDS (OUTPATIENT)
Dept: HEALTH INFORMATION MANAGEMENT | Facility: OTHER | Age: 33
End: 2018-11-01

## 2021-05-03 ENCOUNTER — ALLIED HEALTH/NURSE VISIT (OUTPATIENT)
Dept: FAMILY MEDICINE | Facility: OTHER | Age: 36
End: 2021-05-03
Attending: FAMILY MEDICINE
Payer: COMMERCIAL

## 2021-05-03 DIAGNOSIS — Z20.822 EXPOSURE TO COVID-19 VIRUS: Primary | ICD-10-CM

## 2021-05-03 PROCEDURE — U0003 INFECTIOUS AGENT DETECTION BY NUCLEIC ACID (DNA OR RNA); SEVERE ACUTE RESPIRATORY SYNDROME CORONAVIRUS 2 (SARS-COV-2) (CORONAVIRUS DISEASE [COVID-19]), AMPLIFIED PROBE TECHNIQUE, MAKING USE OF HIGH THROUGHPUT TECHNOLOGIES AS DESCRIBED BY CMS-2020-01-R: HCPCS | Mod: ZL | Performed by: FAMILY MEDICINE

## 2021-05-03 PROCEDURE — U0005 INFEC AGEN DETEC AMPLI PROBE: HCPCS | Mod: ZL | Performed by: FAMILY MEDICINE

## 2021-05-03 PROCEDURE — C9803 HOPD COVID-19 SPEC COLLECT: HCPCS

## 2021-05-04 LAB
SARS-COV-2 RNA RESP QL NAA+PROBE: NORMAL
SPECIMEN SOURCE: NORMAL

## 2021-05-05 LAB
LABORATORY COMMENT REPORT: NORMAL
SARS-COV-2 RNA RESP QL NAA+PROBE: NEGATIVE
SPECIMEN SOURCE: NORMAL

## 2021-05-21 ENCOUNTER — HOSPITAL ENCOUNTER (EMERGENCY)
Facility: OTHER | Age: 36
Discharge: HOME | End: 2021-05-21
Attending: PHYSICIAN ASSISTANT | Admitting: PHYSICIAN ASSISTANT
Payer: COMMERCIAL

## 2021-05-21 VITALS
RESPIRATION RATE: 26 BRPM | BODY MASS INDEX: 26.58 KG/M2 | WEIGHT: 150 LBS | SYSTOLIC BLOOD PRESSURE: 127 MMHG | TEMPERATURE: 98.8 F | OXYGEN SATURATION: 99 % | HEIGHT: 63 IN | DIASTOLIC BLOOD PRESSURE: 98 MMHG | HEART RATE: 79 BPM

## 2021-05-21 DIAGNOSIS — R06.02 SOB (SHORTNESS OF BREATH): ICD-10-CM

## 2021-05-21 DIAGNOSIS — F41.9 ANXIOUS MOOD: ICD-10-CM

## 2021-05-21 LAB
ANION GAP SERPL CALCULATED.3IONS-SCNC: 11 MMOL/L (ref 3–14)
BASOPHILS # BLD AUTO: 0.1 10E9/L (ref 0–0.2)
BASOPHILS NFR BLD AUTO: 0.7 %
BUN SERPL-MCNC: 12 MG/DL (ref 7–25)
CALCIUM SERPL-MCNC: 9.2 MG/DL (ref 8.6–10.3)
CHLORIDE SERPL-SCNC: 104 MMOL/L (ref 98–107)
CO2 SERPL-SCNC: 22 MMOL/L (ref 21–31)
CREAT SERPL-MCNC: 0.76 MG/DL (ref 0.6–1.2)
DIFFERENTIAL METHOD BLD: NORMAL
EOSINOPHIL # BLD AUTO: 0.1 10E9/L (ref 0–0.7)
EOSINOPHIL NFR BLD AUTO: 1 %
ERYTHROCYTE [DISTWIDTH] IN BLOOD BY AUTOMATED COUNT: 11.9 % (ref 10–15)
GFR SERPL CREATININE-BSD FRML MDRD: 86 ML/MIN/{1.73_M2}
GLUCOSE SERPL-MCNC: 122 MG/DL (ref 70–105)
HCT VFR BLD AUTO: 38.2 % (ref 35–47)
HGB BLD-MCNC: 13.7 G/DL (ref 11.7–15.7)
IMM GRANULOCYTES # BLD: 0 10E9/L (ref 0–0.4)
IMM GRANULOCYTES NFR BLD: 0.4 %
LYMPHOCYTES # BLD AUTO: 2.4 10E9/L (ref 0.8–5.3)
LYMPHOCYTES NFR BLD AUTO: 31.5 %
MCH RBC QN AUTO: 32.7 PG (ref 26.5–33)
MCHC RBC AUTO-ENTMCNC: 35.9 G/DL (ref 31.5–36.5)
MCV RBC AUTO: 91 FL (ref 78–100)
MONOCYTES # BLD AUTO: 0.6 10E9/L (ref 0–1.3)
MONOCYTES NFR BLD AUTO: 8.2 %
NEUTROPHILS # BLD AUTO: 4.5 10E9/L (ref 1.6–8.3)
NEUTROPHILS NFR BLD AUTO: 58.2 %
PLATELET # BLD AUTO: 250 10E9/L (ref 150–450)
POTASSIUM SERPL-SCNC: 3.5 MMOL/L (ref 3.5–5.1)
RBC # BLD AUTO: 4.19 10E12/L (ref 3.8–5.2)
SODIUM SERPL-SCNC: 137 MMOL/L (ref 134–144)
TROPONIN I SERPL-MCNC: <2.3 PG/ML
WBC # BLD AUTO: 7.7 10E9/L (ref 4–11)

## 2021-05-21 PROCEDURE — 96374 THER/PROPH/DIAG INJ IV PUSH: CPT | Performed by: PHYSICIAN ASSISTANT

## 2021-05-21 PROCEDURE — 85025 COMPLETE CBC W/AUTO DIFF WBC: CPT | Performed by: PHYSICIAN ASSISTANT

## 2021-05-21 PROCEDURE — 80048 BASIC METABOLIC PNL TOTAL CA: CPT | Performed by: PHYSICIAN ASSISTANT

## 2021-05-21 PROCEDURE — 84484 ASSAY OF TROPONIN QUANT: CPT | Performed by: PHYSICIAN ASSISTANT

## 2021-05-21 PROCEDURE — 93010 ELECTROCARDIOGRAM REPORT: CPT | Performed by: INTERNAL MEDICINE

## 2021-05-21 PROCEDURE — 99283 EMERGENCY DEPT VISIT LOW MDM: CPT | Performed by: PHYSICIAN ASSISTANT

## 2021-05-21 PROCEDURE — 99284 EMERGENCY DEPT VISIT MOD MDM: CPT | Mod: 25 | Performed by: PHYSICIAN ASSISTANT

## 2021-05-21 PROCEDURE — 250N000011 HC RX IP 250 OP 636: Performed by: PHYSICIAN ASSISTANT

## 2021-05-21 PROCEDURE — 93005 ELECTROCARDIOGRAM TRACING: CPT | Performed by: PHYSICIAN ASSISTANT

## 2021-05-21 RX ORDER — LORAZEPAM 2 MG/ML
0.5 INJECTION INTRAMUSCULAR ONCE
Status: COMPLETED | OUTPATIENT
Start: 2021-05-21 | End: 2021-05-21

## 2021-05-21 RX ADMIN — LORAZEPAM 0.5 MG: 2 INJECTION, SOLUTION INTRAMUSCULAR; INTRAVENOUS at 13:06

## 2021-05-21 ASSESSMENT — ENCOUNTER SYMPTOMS
FEVER: 0
CHILLS: 0
BRUISES/BLEEDS EASILY: 0
ADENOPATHY: 0
CHEST TIGHTNESS: 0
NERVOUS/ANXIOUS: 1
ABDOMINAL PAIN: 0
SHORTNESS OF BREATH: 1
BACK PAIN: 0
CONFUSION: 0
HEMATURIA: 0
WOUND: 0

## 2021-05-21 ASSESSMENT — MIFFLIN-ST. JEOR: SCORE: 1339.53

## 2021-05-21 NOTE — ED PROVIDER NOTES
History     Chief Complaint   Patient presents with     Shortness of Breath     Panic Attack     HPI  April L Abigail is a 35 year old female who presents to the ED for evaluation of sob, possible panic attack. She just received her first dose of COVID vaccine and then begin to feel very sob, a burning in her chest and slightly anxious. Prior to vaccine shot she was feeling just fine. Past hx of Guillain-Barré syndrome that she believes was due to obtain an influenza vaccine back in 2004.    Allergies:  No Known Allergies    Problem List:    Patient Active Problem List    Diagnosis Date Noted     H/O Guillain-[place] syndrome 02/05/2018     Priority: Medium        Past Medical History:    Past Medical History:   Diagnosis Date     Guillain-[place] syndrome (H)      Right lower quadrant pain        Past Surgical History:    Past Surgical History:   Procedure Laterality Date     EXTRACTION(S) DENTAL      No Comments Provided     OTHER SURGICAL HISTORY      11/27/2014,61688.0,AR LAP APPENDECTOMY       Family History:    Family History   Problem Relation Age of Onset     Family History Negative Mother         Good Health     Family History Negative Father         Good Health     Diabetes Maternal Grandmother         Diabetes,Type 2 diabetes       Social History:  Marital Status:   [2]  Social History     Tobacco Use     Smoking status: Never Smoker     Smokeless tobacco: Never Used   Substance Use Topics     Alcohol use: No     Alcohol/week: 0.0 standard drinks     Drug use: No        Medications:    benzoyl peroxide (BP WASH) 10 % LIQD  erythromycin with ethanol (EMGEL) 2 % gel  triamcinolone (KENALOG) 0.5 % cream  valACYclovir (VALTREX) 1000 mg tablet          Review of Systems   Constitutional: Negative for chills and fever.   HENT: Negative for congestion.    Eyes: Negative for visual disturbance.   Respiratory: Positive for shortness of breath. Negative for chest tightness.    Cardiovascular: Negative for  "chest pain.   Gastrointestinal: Negative for abdominal pain.   Genitourinary: Negative for hematuria.   Musculoskeletal: Negative for back pain.   Skin: Negative for rash and wound.   Neurological: Negative for syncope.   Hematological: Negative for adenopathy. Does not bruise/bleed easily.   Psychiatric/Behavioral: Negative for confusion. The patient is nervous/anxious.        Physical Exam   BP: (!) 146/72  Pulse: 98  Temp: 98.8  F (37.1  C)  Resp: 26  Height: 160 cm (5' 3\")  Weight: 68 kg (150 lb)  SpO2: 99 %      Physical Exam  Constitutional:       General: She is not in acute distress.     Appearance: She is well-developed. She is not diaphoretic.   HENT:      Head: Normocephalic and atraumatic.   Eyes:      General: No scleral icterus.     Conjunctiva/sclera: Conjunctivae normal.   Neck:      Musculoskeletal: Neck supple.   Cardiovascular:      Rate and Rhythm: Normal rate and regular rhythm.   Pulmonary:      Effort: Pulmonary effort is normal.      Breath sounds: Normal breath sounds.   Abdominal:      Palpations: Abdomen is soft.      Tenderness: There is no abdominal tenderness.   Musculoskeletal:         General: No deformity.   Lymphadenopathy:      Cervical: No cervical adenopathy.   Skin:     General: Skin is warm and dry.      Findings: No rash.   Neurological:      Mental Status: She is alert and oriented to person, place, and time. Mental status is at baseline.   Psychiatric:         Mood and Affect: Mood is anxious.         Behavior: Behavior normal.         ED Course        Procedures          EKG read at 1306. HR 89, NSR, no ST changes.     Critical Care time:  none               Results for orders placed or performed during the hospital encounter of 05/21/21 (from the past 24 hour(s))   CBC with platelets differential   Result Value Ref Range    WBC 7.7 4.0 - 11.0 10e9/L    RBC Count 4.19 3.8 - 5.2 10e12/L    Hemoglobin 13.7 11.7 - 15.7 g/dL    Hematocrit 38.2 35.0 - 47.0 %    MCV 91 78 - 100 " fl    MCH 32.7 26.5 - 33.0 pg    MCHC 35.9 31.5 - 36.5 g/dL    RDW 11.9 10.0 - 15.0 %    Platelet Count 250 150 - 450 10e9/L    Diff Method Automated Method     % Neutrophils 58.2 %    % Lymphocytes 31.5 %    % Monocytes 8.2 %    % Eosinophils 1.0 %    % Basophils 0.7 %    % Immature Granulocytes 0.4 %    Absolute Neutrophil 4.5 1.6 - 8.3 10e9/L    Absolute Lymphocytes 2.4 0.8 - 5.3 10e9/L    Absolute Monocytes 0.6 0.0 - 1.3 10e9/L    Absolute Eosinophils 0.1 0.0 - 0.7 10e9/L    Absolute Basophils 0.1 0.0 - 0.2 10e9/L    Abs Immature Granulocytes 0.0 0 - 0.4 10e9/L   Basic metabolic panel   Result Value Ref Range    Sodium 137 134 - 144 mmol/L    Potassium 3.5 3.5 - 5.1 mmol/L    Chloride 104 98 - 107 mmol/L    Carbon Dioxide 22 21 - 31 mmol/L    Anion Gap 11 3 - 14 mmol/L    Glucose 122 (H) 70 - 105 mg/dL    Urea Nitrogen 12 7 - 25 mg/dL    Creatinine 0.76 0.60 - 1.20 mg/dL    GFR Estimate 86 >60 mL/min/[1.73_m2]    GFR Estimate If Black >90 >60 mL/min/[1.73_m2]    Calcium 9.2 8.6 - 10.3 mg/dL   Troponin GH   Result Value Ref Range    Troponin <2.3 <34.0 pg/mL       Medications   LORazepam (ATIVAN) injection 0.5 mg (0.5 mg Intravenous Handoff 5/21/21 1413)       Assessments & Plan (with Medical Decision Making)   Pt is anxious appearing.  Heart, lung, bowel sounds are normal.  Abdomen.  Soft nontender palpation.  Vital signs are stable and she is afebrile.    Cranial nerves II through XII intact.  Good strength in all extremities.    EKG, lab work appears very well.    She was given a dose of Ativan and observed in the ED.  Approximately 1 hour she appear to be doing very well with full resolution of symptoms.    Is difficult to say what is causing her symptoms at this time but it seems most consistent with a slight panic attack after receiving her shot.    However, she is encouraged to return to the emergency department if her symptoms return or greatly worsen or are concerning.  She understands and agrees with  plan patient is discharged.    Nathaniel Guerrero PA-C    I have reviewed the nursing notes.    I have reviewed the findings, diagnosis, plan and need for follow up with the patient.       Discharge Medication List as of 5/21/2021  2:14 PM          Final diagnoses:   SOB (shortness of breath)   Anxious mood       5/21/2021   [facility]     Nathaniel Guerrero PA  05/21/21 8314

## 2021-05-21 NOTE — DISCHARGE INSTRUCTIONS
Get plenty of fluids and rest.  As we discussed your EKG, lab work specifically looking at your heart appeared very well.  After a small dose of antianxiety medication your symptoms seem to have fully resolved.  Is unclear what caused her symptoms.  If over the coming days you have any worsening or concerning symptoms please return for further evaluation, otherwise follow with PCP as needed.

## 2021-05-21 NOTE — ED TRIAGE NOTES
"ED Nursing Triage Note (General)   ________________________________    April SYLWIA Hayes is a 35 year old Female that presents to triage private car  With history of  Chest heaviness, feeling like cant breath after getting her first covid vaccine approx half and hour before arrival reported by patient   Significant symptoms had onset 30 minute(s) ago.  BP (!) 146/72   Pulse 98   Temp 98.8  F (37.1  C) (Tympanic)   Resp 26   Ht 1.6 m (5' 3\")   Wt 68 kg (150 lb)   SpO2 99%   BMI 26.57 kg/m  t  Patient appears alert , in mild distress., and cooperative behavior.  Anxiety: appears very anxious  GCS Eye Opening = 4=Spontaneous  Airway: intact  Breathing noted as Normal  Circulation Normal  Skin:  Normal  Action taken:  Triage to critical care immediately      PRE HOSPITAL PRIOR LIVING SITUATION Spouse  "

## 2021-05-24 LAB — INTERPRETATION ECG - MUSE: NORMAL

## 2021-06-19 ENCOUNTER — HEALTH MAINTENANCE LETTER (OUTPATIENT)
Age: 36
End: 2021-06-19

## 2021-10-09 ENCOUNTER — HEALTH MAINTENANCE LETTER (OUTPATIENT)
Age: 36
End: 2021-10-09

## 2022-07-16 ENCOUNTER — HEALTH MAINTENANCE LETTER (OUTPATIENT)
Age: 37
End: 2022-07-16

## 2022-09-17 ENCOUNTER — HEALTH MAINTENANCE LETTER (OUTPATIENT)
Age: 37
End: 2022-09-17

## 2023-07-29 ENCOUNTER — HEALTH MAINTENANCE LETTER (OUTPATIENT)
Age: 38
End: 2023-07-29

## (undated) RX ORDER — LORAZEPAM 2 MG/ML
INJECTION INTRAMUSCULAR
Status: DISPENSED
Start: 2021-05-21